# Patient Record
Sex: FEMALE | Race: WHITE | URBAN - METROPOLITAN AREA
[De-identification: names, ages, dates, MRNs, and addresses within clinical notes are randomized per-mention and may not be internally consistent; named-entity substitution may affect disease eponyms.]

---

## 2024-10-10 ENCOUNTER — OFFICE VISIT (OUTPATIENT)
Dept: FAMILY MEDICINE CLINIC | Facility: CLINIC | Age: 59
End: 2024-10-10
Payer: COMMERCIAL

## 2024-10-10 VITALS
WEIGHT: 153 LBS | SYSTOLIC BLOOD PRESSURE: 128 MMHG | OXYGEN SATURATION: 100 % | RESPIRATION RATE: 18 BRPM | TEMPERATURE: 98 F | HEIGHT: 62 IN | HEART RATE: 76 BPM | BODY MASS INDEX: 28.16 KG/M2 | DIASTOLIC BLOOD PRESSURE: 70 MMHG

## 2024-10-10 DIAGNOSIS — Z12.11 SCREENING FOR COLON CANCER: ICD-10-CM

## 2024-10-10 DIAGNOSIS — Z00.00 WELLNESS EXAMINATION: Primary | ICD-10-CM

## 2024-10-10 DIAGNOSIS — Z78.9 STATIN INTOLERANCE: ICD-10-CM

## 2024-10-10 DIAGNOSIS — I10 ESSENTIAL HYPERTENSION: ICD-10-CM

## 2024-10-10 DIAGNOSIS — Z28.21 IMMUNIZATION DECLINED: ICD-10-CM

## 2024-10-10 DIAGNOSIS — E11.65 TYPE 2 DIABETES MELLITUS WITH HYPERGLYCEMIA, WITHOUT LONG-TERM CURRENT USE OF INSULIN (HCC): ICD-10-CM

## 2024-10-10 DIAGNOSIS — Z00.00 LABORATORY EXAM ORDERED AS PART OF ROUTINE GENERAL MEDICAL EXAMINATION: ICD-10-CM

## 2024-10-10 DIAGNOSIS — L50.1 IDIOPATHIC URTICARIA: ICD-10-CM

## 2024-10-10 DIAGNOSIS — Z23 NEED FOR TDAP VACCINATION: ICD-10-CM

## 2024-10-10 DIAGNOSIS — Z12.31 ENCOUNTER FOR SCREENING MAMMOGRAM FOR MALIGNANT NEOPLASM OF BREAST: ICD-10-CM

## 2024-10-10 DIAGNOSIS — Z23 NEED FOR VACCINATION AGAINST STREPTOCOCCUS PNEUMONIAE: ICD-10-CM

## 2024-10-10 PROCEDURE — 90677 PCV20 VACCINE IM: CPT | Performed by: FAMILY MEDICINE

## 2024-10-10 PROCEDURE — 90471 IMMUNIZATION ADMIN: CPT | Performed by: FAMILY MEDICINE

## 2024-10-10 PROCEDURE — 90715 TDAP VACCINE 7 YRS/> IM: CPT | Performed by: FAMILY MEDICINE

## 2024-10-10 PROCEDURE — 99386 PREV VISIT NEW AGE 40-64: CPT | Performed by: FAMILY MEDICINE

## 2024-10-10 PROCEDURE — 99204 OFFICE O/P NEW MOD 45 MIN: CPT | Performed by: FAMILY MEDICINE

## 2024-10-10 PROCEDURE — 90472 IMMUNIZATION ADMIN EACH ADD: CPT | Performed by: FAMILY MEDICINE

## 2024-10-10 RX ORDER — SITAGLIPTIN 100 MG/1
1 TABLET, FILM COATED ORAL DAILY
COMMUNITY
End: 2024-10-10

## 2024-10-10 RX ORDER — SITAGLIPTIN 100 MG/1
100 TABLET, FILM COATED ORAL DAILY
Qty: 90 TABLET | Refills: 1 | Status: SHIPPED | OUTPATIENT
Start: 2024-10-10

## 2024-10-10 RX ORDER — LISINOPRIL 5 MG/1
TABLET ORAL
COMMUNITY
End: 2024-10-10

## 2024-10-10 RX ORDER — BLOOD-GLUCOSE METER
KIT MISCELLANEOUS 2 TIMES DAILY
COMMUNITY
Start: 2024-08-15

## 2024-10-10 RX ORDER — LISINOPRIL 5 MG/1
2.5 TABLET ORAL 2 TIMES DAILY
Qty: 90 TABLET | Refills: 1 | Status: SHIPPED | OUTPATIENT
Start: 2024-10-10

## 2024-10-10 RX ORDER — LANCETS 28 GAUGE
1 EACH MISCELLANEOUS DAILY
Qty: 100 EACH | Refills: 3 | Status: SHIPPED | OUTPATIENT
Start: 2024-10-10 | End: 2025-10-10

## 2024-10-10 NOTE — PROGRESS NOTES
The  [employer] makes medical notes like these available to patients in the interest of transparency. Please be advised this is a medical document. Medical documents are intended to carry relevant information, facts as evident, and the clinical opinion of the practitioner. The medical note is intended as peer to peer communication and may appear blunt or direct. It is written in medical language and may contain abbreviations or verbiage that are unfamiliar.     Shereen Suarez is a 59 year old female who is here for   Chief Complaint   Patient presents with    Well Adult       HPI:     This 59-year-old female who is a new patient to my practice presents to the office for her annual wellness exam and to establish care.    The patient has moved back to [place] from [place], [place].  She states she has had a very stressful last 24 months.  Both her mother and father  within that period of time.  They are just starting to get settled in their new house.    The patient was diagnosed with type 2 diabetes in 2016 and hypertension in 2019.  The patient denies any history for heart disease.  She is taking her blood pressure medication which she splits in half -taking the lisinopril 5 mg half tablet twice daily; otherwise, she gets dizzy later in the day.  This has controlled her blood pressure very well.  She is not having any chest pain, palpitations, shortness of breath, dizziness, syncope or leg swelling.    She is taking Januvia 100 mg daily and metformin 500 mg twice daily for her blood sugars.  She is concerned because her blood sugars have been running higher between 150 and 190.  She again feels this is due to all the stresses she has going on in her life.  She has not had a recent eye exam.  She needs refills on all her medications and her lancets.  The patient states her previous provider did put her on simvastatin and after 2 days, she did not tolerate it because her blood sugars  were running  high  in the 200's-as soon as she was off the simvastatin, her blood sugars corrected.  She does not want to be on a statin    Since moving back to [place] from [place], the patient has developed spontaneous hives.  These occur primarily when she is scratching her skin and she gets linear welts.  She has not been taking any antihistamines.  She has no history of swelling of the lips or tongue with the hives.  She has no past history for asthma.  She states they will last for 2 to 3 days and then resolved spontaneously.  She would like to see an allergist.    The patient had hysterectomy in 2018 due to uterine fibroids.  She states she had everything removed including her ovaries.  She is overdue for her mammogram.    She has never had any colon cancer screening.  She denies any abdominal pain, nausea, vomiting, diarrhea, constipation, melena, hematochezia or change in bowel habits.  There is no family history for colon cancer.    The patient declines her flu shot today.  She states she always gets sick after having her flu shot.  She is interested in getting her Tdap booster, Prevnar 20 and shingles vaccine.      Exercise: walking daily  Diet: watches sugar closely, low carb diet, and watches somewhat  Sleep: 6-8 hours     Depression/Anxiety:   PHQ-2 SCORE: 2  , done 10/10/2024   Little interest or pleasure in doing things: 1    Feeling down, depressed, or hopeless: 1    If you checked off any problems, how difficult have these problems made it for you to do your work, take care of things at home, or get along with other people?: Not difficult at all    Last [place] Suicide Screening on 10/10/2024 was No Risk.       Fall Risk: No falls last 3 months  Gun in home:No            Glasses/contacts:Reading glasses             Recent eye doctor visit:No   Hearing aids:No    Family History for:  Breast ca-maternal cousin  Ovarian ca-No  Uterine ca-No  Colon ca-No  Lung ca-Mom-smoker, small cell      FDLMP 2016  Last  pap smear: 2017  Last Mammogram: 2017    Colonoscopy: Never      Past Medical History:    Diabetes mellitus (HCC)    Essential hypertension    Viral meningitis (HCC)       Past Surgical History:   Procedure Laterality Date    Hysterectomy  2018    Had TAHBSO for fibroids       Family History   Problem Relation Age of Onset    Cancer Mother         small cell of lung-smoker    Stroke Father 51        lived to        Social History     Socioeconomic History    Marital status:    Tobacco Use    Smoking status: Never    Smokeless tobacco: Never   Vaping Use    Vaping status: Never Used   Substance and Sexual Activity    Alcohol use: Yes     Alcohol/week: 7.0 standard drinks of alcohol     Types: 7 Glasses of wine per week     Comment: 1 glass of wine per night    Drug use: Never    Sexual activity: Not Currently     Retired-worked in computers    Medications:    Medications Ordered Prior to Encounter[1]    Allergies:    Allergies[2]    REVIEW OF SYSTEMS:     See HPI for relevant ROS  GENERAL HEALTH: no other complaints  NEURO: no other complaints  VISION: no other complaints  RESPIRATORY: no other complaints  CARDIOVASCULAR: no other complaints  GI: no other complaints  : no other complaints  SKIN: no other complaints  PSYCH: no other complaints  EXT: no other complaints        EXAM:   /70 (BP Location: Left arm, Patient Position: Sitting, Cuff Size: adult)   Pulse 76   Temp 98 °F (36.7 °C)   Resp 18   Ht 5' 2\" (1.575 m)   Wt 153 lb (69.4 kg)   SpO2 100%   BMI 27.98 kg/m²     Wt Readings from Last 3 Encounters:   10/10/24 153 lb (69.4 kg)       BP Readings from Last 3 Encounters:   10/10/24 128/70        Visual Acuity  Right Eye Visual Acuity: Uncorrected Right Eye Chart Acuity: 20/25   Left Eye Visual Acuity: Uncorrected Left Eye Chart Acuity: 20/25   Both Eyes Visual Acuity: Uncorrected Both Eyes Chart Acuity: 20/25   Able To Tolerate Visual Acuity: Yes      General: WH/Overweight/WD, in NAD, A  and O times 3  HEAD: Normocephalic, atraumatic  EYES: USMAN, EOMI, conjunctiva normal, sclera anicteric.  EARS: Tympanic membranes normal, EAC's normal.  NOSE: Turbninates normal, no bleeding noted.  PHARYNX:  No eythema or exudates, mucous membrane moist, airway patent.  NECK:  No cervical lymphadenopathy or thyromegaly, no bruits noted.  HEART: Regular rate and rhythm, no S3, S4 or murmur noted.  LUNGS: Clear to ausculation. No retractions or tachypnea noted.  BREASTS: No masses, nipple discharge or retractions noted bilaterally. No axillary lymph nodes palpated bilaterally.  ABDOMEN: Soft, nontender, no guarding, rigidity or rebound, no masses or hepatosplenomegaly, normal bowel sounds in all four quadrants.  : deferred-s/p hysterectomy  BACK: No tenderness over the thoracic or lumbar spine. No scoliosis noted.  EXTREMITIES: No clubbing, cyanosis, edema noted. Motor strength +5/5 in all 4 extremities. DTR's +2/4 in all 4 extremities. Able to toe and heel walk.  SKIN: Warm and dry. No hives currently noted.  NEURO: Cr. N. II-XII intact, normal gait  PSYCH: Normal affect and mood.        ASSESSMENT AND PLAN:     1. Wellness examination  -We discussed the following:  Healthy diet and exercise, cancer screening, self breast exams and calcium and vitamin D supplementation. Patient encouraged to get her Shingles vaccine at her local pharmacy.    2. Laboratory exam ordered as part of routine general medical examination    - CBC With Differential With Platelet  - Comp Metabolic Panel (14)  - Lipid Panel  - TSH W Reflex To Free T4  - HCV Antibody    3. Encounter for screening mammogram for malignant neoplasm of breast    - [facility] ANDRA 2D+3D SCREENING BILAT (CPT=77067/07792); Future    4. Screening for colon cancer    Patient referred to Dr. Miner for her colonoscopy.    - Gastro Referral - In Network    5. Immunization declined    Patient declined flu shot.    - INFLUENZA REFUSED EE    6. Essential hypertension    The  patient's blood pressure is well-controlled on her current medications which I refilled today.    - lisinopril 5 MG Oral Tab; Take 0.5 tablets (2.5 mg total) by mouth 2 (two) times daily.  Dispense: 90 tablet; Refill: 1    7. Type 2 diabetes mellitus with hyperglycemia, without long-term current use of insulin (HCC)    Diabetic foot exam was discussed with the patient.  She is referred to Dr. Sarahi Grace for her diabetic eye exam.  She is due for all of her laboratory.  I refilled her medications today.    - MICROALB/CREAT RATIO, RANDOM URINE [6217] [Q]  - HGB A1C [496] [Q]  - metFORMIN 500 MG Oral Tab; Take 1 tablet (500 mg total) by mouth 2 (two) times daily.  Dispense: 180 tablet; Refill: 1  - JANUVIA 100 MG Oral Tab; Take 1 tablet (100 mg total) by mouth daily.  Dispense: 90 tablet; Refill: 1  - FreeStyle Lancets Does not apply Misc; 1 Lancet by Finger stick route daily. Use as directed.  Dispense: 100 each; Refill: 3  - OPHTHALMOLOGY - INTERNAL    8. Statin intolerance    The patient states she was unable to tolerate simvastatin because it elevated her blood sugars into the 200's.  She is due for recheck on her lipid panel.  And then we can discuss possible trial of a different statin.    9. Idiopathic urticaria    I reviewed the pictures on the patient's phone of the hives.  This looks like dermographism..  I told the patient she can take over-the-counter antihistamines such as Allegra, Claritin or Zyrtec when the events happen.  She is referred to Dr.Van Jaime, allergist, for further evaluation.    - Allergy Referral - In Network    10. Need for vaccination against Streptococcus pneumoniae    The patient was given Prevnar 20 today. Side effects were reviewed.    - Prevnar 20 (PCV20) [97958]    11. Need for Tdap vaccination    The patient was given Tdap booster today.  Side effects reviewed.  - TdaP (Adacel, Boostrix) [89184]         Health Maintenance:    Health Maintenance   Topic Date Due    Annual  Physical  10/10/2025    Colorectal Cancer Screening  Never done    Mammogram  Never done    DTaP,Tdap,and Td Vaccines (1 - Tdap) 10/10/2034    Zoster Vaccines (1 of 2) Never done    Annual Depression Screening  Completed    Influenza Vaccine (1) 10/01/2025    COVID-19 Vaccine  Completed    Pneumococcal Vaccine: Birth to 64yrs  Aged Out         Orders This Visit:  Orders Placed This Encounter   Procedures    CBC With Differential With Platelet    Comp Metabolic Panel (14)    Lipid Panel    TSH W Reflex To Free T4    HCV Antibody    MICROALB/CREAT RATIO, RANDOM URINE [6917] [Q]    HGB A1C [496] [Q]    INFLUENZA REFUSED EEH    TdaP (Adacel, Boostrix) [45908]    Prevnar 20 (PCV20) [48520]       Meds This Visit:  Requested Prescriptions     Signed Prescriptions Disp Refills    metFORMIN 500 MG Oral Tab 180 tablet 1     Sig: Take 1 tablet (500 mg total) by mouth 2 (two) times daily.    JANUVIA 100 MG Oral Tab 90 tablet 1     Sig: Take 1 tablet (100 mg total) by mouth daily.    lisinopril 5 MG Oral Tab 90 tablet 1     Sig: Take 0.5 tablets (2.5 mg total) by mouth 2 (two) times daily.    FreeStyle Lancets Does not apply Misc 100 each 3     Si Lancet by Finger stick route daily. Use as directed.       Imaging & Referrals:  INFLUENZA REFUSED EEH  TETANUS, DIPHTHERIA TOXOIDS AND ACELLULAR PERTUSIS VACCINE (TDAP), >7 YEARS, IM USE  PCV20 VACCINE FOR INTRAMUSCULAR USE  OPHTHALMOLOGY - INTERNAL  ALLERGY - INTERNAL  GASTRO - INTERNAL  [facility]A ANDRA 2D+3D SCREENING BILAT (CPT=77067/10967)       The patient indicates understanding of these issues and agrees to the plan.  The patient is asked to return in 6 months or pending lab results.  Brooke Morataya DO    Total time: 60 minutes including precharting, H&P, plan of care.    This dictation was performed with a verbal recognition program (DRAGON) and it was checked for errors. It is possible that there are still dictated errors within this office note. If so, please bring any  errors to my attention for an addendum. All efforts were made to ensure that this office note is accurate         [1]   Current Outpatient Medications on File Prior to Visit   Medication Sig Dispense Refill    Glucose Blood (FREESTYLE LITE TEST) In Vitro Strip 2 (two) times a day.       No current facility-administered medications on file prior to visit.   [2]   Allergies  Allergen Reactions    Sulfa Antibiotics HIVES

## 2024-10-10 NOTE — PATIENT INSTRUCTIONS
Go to the [facility] for your fasting blood tests. Do not eat or drink except for water for at least 8 hours prior to the blood tests. Do not take any vitamins or Biotin for 3 days before your blood tests.    Schedule your mammogram as ordered.    Schedule your colonoscopy with Dr. Miner or one of her partners as ordered.    Schedule your diabetic eye exam with Dr. Sarahi Grace.    Schedule your appointment with the allergist, Dr. Diaz, for follow up on your hives.    Continue your medications as prescribed.    You received the Flu and Tdap (tetanus, diphtheria, pertussis-whooping cough) vaccines today. You may run a low grade fever, have mild redness or swelling at the site of the shot, muscle pain at the site of the shot for the next 2-3 days. You may take Tylenol or Ibuprofen as needed. Use your arm to help decrease pain and swelling. You can apply ice to any swelling for 10-15 minutes twice daily through clothing or a towel.    Get your shingles vaccine at your local pharmacy.    Recommendations for exercise are 3-5 times weekly for 30-60 minutes for a minimum of 150-300 minutes.     For premenopausal women and men, 1000 mg of calcium daily is recommended. For postmenopausal women, 1200 mg of calcium daily is recommended.    To help the body absorb and use calcium, vitamin D 2000 international units daily is recommended.    I will contact you with your test results once available.    Controlling High Blood Pressure   High blood pressure (hypertension) is often called the silent killer. This is because many people who have it, don’t know it. It can be very dangerous. High blood pressure can raise your risk of heart attack, stroke, heart disease, and heart failure. Controlling your blood pressure can lower your risk of these problems. It's important to check yourblood pressure regularly. It can save your life.   Blood pressure measurements are given as 2 numbers. Systolic blood pressure is the upper number.  This is the pressure when the heart contracts. Diastolic blood pressure is the lower number. This is the pressure when the heartrelaxes between beats.   Blood pressure is grouped like this:   Normal blood pressure. This is systolic of less than 120 and diastolic of less than 80 (120/80).  Elevated blood pressure.  This is systolic of 120 to 129 and diastolic less than 80.  Stage 1 high blood pressure.  This is systolic of 130 to 139 or diastolic between 80 to 89.  Stage 2 high blood pressure.  This is systolic of 140 or higher or diastolic of 90 or higher.  A heart-healthy lifestyle can help you control your blood pressure withoutmedicines. Below are some things you can do to have a heart-healthy lifestyle.     Eat heart-healthy foods   Choose low-salt, low-fat foods. Limit your sodium to 2,300 mg per day or the amount advised by your healthcare provider.  Limit canned, dried, cured, packaged, and fast foods. These can contain a lot of salt.  Eat 8 to 10 servings of fruits and vegetables every day.  Choose lean meats, fish, or chicken.  Eat whole-grain pasta, brown rice, and beans.  Eat 2 to 3 servings of low-fat or fat-free dairy products.  Ask your doctor about the DASH eating plan. This plan helps reduce blood pressure.  When you go to a restaurant, ask that your meal be made with no added salt.    Stay at a healthy weight   Ask your healthcare provider how many calories to eat a day. Then stick to that number.  Ask your provider what weight range is healthiest for you. If you are overweight, a weight loss of only 3% to 5% of your body weight can help lower blood pressure. A good weight loss goal is to lose 10% of your body weight in a year.  Limit snacks and sweets.  Get regular exercise.    Get more active   Find activities you enjoy. They can be done alone or with friends or family. Try bicycling, dancing, walking, or jogging.  Park farther away from building entrances to walk more.  Use stairs instead of the  elevator.  When you can, walk or bike instead of driving.  [place] leaves, garden, or do household repairs.  Be active at a moderate to vigorous level of physical activity for at least 30 minutes a day for at least 5 days a week.     Manage stress   Make time to relax and enjoy life. Find time to laugh.  Talk about your concerns with your loved ones and your healthcare provider.  Visit with family and friends, and keep up with hobbies.    Limit alcohol and quit smoking   Men should have no more than 2 drinks per day.  Women should have no more than 1 drink per day.  If you smoke, make a plan to stop. Talk with your healthcare provider for help. Smoking greatly raises your risk for heart disease and stroke. Ask your provider about stop-smoking programs and other support.    Blood pressure medicines  If your lifestyle changes aren’t enough, your healthcare provider may prescribe high blood pressure medicine. Take all medicines as prescribed. If you have any questions about yourmedicines, ask your provider before stopping or changing them.   [employer] last reviewed this educational content on12/1/2021 © 2000-2022 [employer]. All rights reserved. This information is not intended as a substitute for professional medical care. Always follow yourhealthcare professional's instruction    Video frintit™  What is High Blood Pressure?  Understand what blood pressure is, the health risks of having high blood pressure, the factors that put you at risk for having high blood pressure, and the importance of working with your healthcare provider to control it.  To watch the video:  Scan the QR code  Using your mobile device, scan the following code:  OR  Go to the website:  [email or web address]  Enter the prescription code:  3414E    © 2000-2022 [employer]. All rights reserved. This information is not intended as a substitute for professional medical care. Always follow your healthcare  professional's instructions.    Video HealthSheets™  Eating Well with High Blood Pressure  Certain foods can make your blood pressure go too high. Watch and learn how easy it is to have delicious meals without harming your health.     To watch the video:  Scan the QR code  Using your mobile device, scan the following code:  OR  Go to the website:  www.[employer]  Enter the prescription code:   QIX       © 7593-5163 [employer]. All rights reserved. This information is not intended as a substitute for professional medical care. Always follow your healthcare professional's instructions.    Taking Your Blood Pressure  Blood pressure is the force of blood against the artery wall as it moves from the heart through the blood vessels. You can take your own blood pressure reading using a digital monitor. Take your readings the same each time, usingthe same arm. Take readings as often as your healthcare provider advises.   About blood pressure monitors  Blood pressure monitors are designed for certain ages and cases. You can find monitors for older adults, for pregnant women, and for children. Make sure theone you choose is the right one for your age and situation.   Experts advise an automatic cuff monitor that fits on your upper arm (bicep). The cuff should fit your arm size. A cuff that’s too large or too small won't give an accurate reading. Measure around yourupper arm to find your size.   Monitors that attach to your finger or wrist are not as accurate as monitorsfor your upper arm.   Ask your healthcare provider for help in choosing a monitor. Bring your monitorto your next provider visit if you need help in using it the correct way.   The steps below are general instructions for using an automatic digitalmonitor.   Step 1. Relax    Take your blood pressure at the same time every day, such as in the morning or evening. Or take it at the time your healthcare provider advises.  Wait at least 30  minutes after smoking, eating, or exercising. Don't drink coffee, tea, soda, or other caffeinated drinks before checking your blood pressure. Use the restroom beforehand.  Sit comfortably at a table with both feet on the floor. Don't cross your legs or feet. Place the monitor near you.  Rest for at least 5 minutes before you begin. Make sure there are no distractions. This includes TV, cell phones, and other electronics. Wait to have conversations with others until after you measure you blood pressure.  Step 2. Wrap the cuff    Place your arm on the table, palm up. Your arm should be at the level of your heart. Wrap the cuff around your upper arm, just above your elbow. It’s best done on bare skin, not over clothing. Most cuffs will show you where the blood vessel in the middle of the arm at the inner side of the elbow (the brachial artery) should line up with the cuff. Look in your monitor's instruction booklet for an illustration. You can also bring your cuff to your healthcare provider and have them show you how to correctly place the cuff.  Step 3. Inflate the cuff    Push the button that starts the pump.  The cuff will tighten, then loosen.  The numbers will change. When they stop changing, your blood pressure reading will appear.  Take 2 or 3 readings 1 minute apart, or as advised by your provider.  Step 4. Write down the results of each reading    Write down your blood pressure numbers for each reading. Note the date and time. Keep your results in 1 place, such as a notebook. Even if your monitor has a built-in memory, keep a hard copy of the readings.  Remove the cuff from your arm. Turn off the machine.  Bring your blood pressure records with you to each provider visit.  If you start a new blood pressure medicine, note the day you started the new medicine. Also note the day if you change the dose of your medicine. Measure your blood pressure before your take your medicine. This information goes on your  blood pressure recording sheet. This will help your provider check how well the medicine changes are working.  Ask your provider what numbers mean that you should call them. Also ask what numbers mean that you should get help right away.  [employer] last reviewed this educational content on12/1/2021 © 2000-2022 [employer]. All rights reserved. This information is not intended as a substitute for professional medical care. Always follow yourhealthcare professional's instructions.      Video Sc[employer]erm  What is Type 2 Diabetes?  Watch this clip to understand what happens within your body when you have type 2 diabetes, and the importance of keeping your blood glucose levels within a healthy range.  To watch the video:  Scan the QR code  Using your mobile device, scan the following code:  OR  Go to the website:  [employer]  Enter the prescription code:  1576E    © 2000-2022 [employer]. All rights reserved. This information is not intended as a substitute for professional medical care. Always follow your healthcare professional's instructions.    Managing Type 2 Diabetes  Type 2 diabetes is a long-term (chronic) condition. Managing diabetes may mean making some tough changes. Yourhealthcare team can help you.  To manage type 2 diabetes, you'll need to balance your medicine with diet and activity. You will also need check your blood sugar. And work with yourhealthcare provider to prevent complications.    Take your medicine  You may take pills or give yourself insulin shots for diabetes. Or you may use both. Take your medicines or give yourself insulin at the right times to help you control your blood sugar. Think about ways that will help you remember to take your medicines the right way every day. Ask your healthcare provider orteam for ideas.  You may only take pills for your diabetes. But this may change. Over time, mostpeople with type 2 diabetes also need  insulin.  Eat healthy  A healthy diet helps control the amount of sugar in your blood. It also helps you stay at a healthy weight. Or it helps you lose weight, if if you'reoverweight. Extra weight makes it harder to control diabetes.  Your healthcare team will help you create a plan that works for you. You don'thave to give up all the foods you like. Have meals and snacks with:  Vegetables  Fruits  Lean meats or other healthy proteins  Whole grains  Low- or nonfat dairy products     Be physically active  Being active helps lower your blood sugar. Activity helps your body use insulinto turn food into energy. It also helps you manage your weight:  Ask your healthcare provider to help you to make an activity program that's right for you. Your program is based on your age, general health, and types of activity you enjoy. Start slow. But aim for at least 150 minutes of exercise or activity each week. Start with 30 minutes a day. Exercise in 10-minute blocks. Don’t let more than 2 days go by without being active.  Check your blood sugar  Checking your own blood sugar may be a regular part of your care. Or you may only need to check your blood sugar from time to time. Your healthcare provider will tell you how to check your blood sugar at home. Checking it tells you if your blood sugar is in your target range. Having your blood sugars within thetarget range means that you are managing your diabetes well.  If your blood sugar levels are too high or too low, your healthcare provider may suggest changes to your diet or activity level. He or she may also adjustyour medicine.  Your healthcare provider may also tell you to check your blood sugar more oftenwhen you are sick.  Take care of yourself  When you have diabetes, you may be more likely to get other health problems. They include foot, eye, heart, nerve, and kidney problems. You can help prevent these problems by controlling your blood sugar and taking good care of  yourself. Your healthcare provider, nurse, diabetes educator, and others canhelp you with the following:  Checkups. You should have regular checkups with your healthcare provider. At those visits, you will have a physical exam that includes checking your feet. Your healthcare provider will also check your blood pressure and weight. Take your shoes off before your appointment starts to be sure your feet are checked.  Other exams. You'll also need eye, foot, and dental exams at least once each year.  Lab tests. You will have blood and urine tests:  Your healthcare provider will check your hemoglobin A1C at least twice a year. This blood test shows how well you have been controlling your blood sugar over 2 to 3 months. The results help your healthcare provider manage your diabetes.    You will also have other lab tests. For example, to check for kidney problems and abnormal cholesterol levels.  Smoking. If you smoke, you will need to quit. Smoking makes it more likely to get complications from diabetes. Ask your healthcare provider about ways to quit. Also don't use e-cigarette, or vaping, products.  Vaccines. Get a yearly flu shot. And ask your healthcare provider about vaccines to prevent pneumonia, shingles, and hepatitis B.  Stress and depression  Most people have challenges throughout their lives. Living with diabetes can increase your stress. Feeling stressed or depressed can actually affect yourblood sugar levels.  Tell your healthcare provider if you are having trouble coping with diabetes.He or she can help or refer you to other providers or programs.  To learn more  Know where you can get help. You can try the following:  Support. Ask family and friends to support your efforts to take care of yourself. Or look for a diabetes support group nearby or on the internet. Check the Connect with Others link on www.diabetes.org.  Counseling. Talk with a , psychologist, psychiatrist, or other  counselor.  Information. Contact the American Diabetes Association at 232-879-2176 or www.diabetes.org  [employer]Well last reviewed this educational content on11/1/2019    © 1358-7591 [employer]. All rights reserved. This information is not intended as a substitute for professional medical care. Always follow yourhealthcare professional's instructions.    Type 2 Diabetes and Food Choices  You make food choices every day. Whole wheat or white bread? A side of French fries or fresh fruit? Eat now or later? Choices about what, when, and how much you eat affect your blood sugar (glucose), and also your blood pressure and cholesterol. Understanding how food affects blood glucose is the first step in managing diabetes. And following a diabetesmeal plan can help you keep your blood glucose levels on track.   Prevent problems  Having type 2 diabetes means that your body doesn’t control blood glucose well. When blood glucose stays too high for too long, serious health problems can develop. It's important to control your blood glucose through diet, exercise, and medicine. This can delay or prevent kidney,eye, nerve, and heart disease, and other complications of diabetes.   Control carbohydrates  Carbohydrates are foods that have the biggest effect on your blood glucose levels. After you eat carbohydrates, your blood glucose rises. Fruit, sweet foods and drinks, starchy foods (such as bread, potatoes, and rice), and milk and milk products contain carbohydrates. Carbohydrates are important for health. But when you eat too many at once, your blood glucose can go too high. This is even more likely if you don't have or take enough insulin forthat food.   Some carbohydrates may raise blood glucose more than others. These include potatoes, sweets, and white bread. Better choices are less processed foods with more fiber and nutrients. Good options are 100% whole-wheat bread, oatmeal,brown rice, and nonstarchy vegetables.    Learn to use food labels that show added sugar. And try to find healthierchoices, particularly if you are overweight.    Food and medicine  Insulin helps glucose move from the blood into your muscle cells, where it can be used for energy. Some diabetes medicines that are taken by mouth help you make more insulin. Or they help your insulin work more efficiently. So your medicines and food plan have to work together. If you take insulin shots, you need to be very careful to match the amount of carbohydrates you eat with your insulin dose. If you have too many carbohydrates without adjusting your insulin dose, your blood glucose might become too high. If you have too few carbohydrates, your blood glucose might be too low. Your healthcare provider ora dietitian can help you match your food choices to your medicine.   Have a meal plan  With certain medicines, it's best to eat the same amount of food at the same time every day. That keeps your glucose levels stable. And it helps your medicine work best. Physical activity is an important way to control blood glucose, too. Try to exercise at the same time every day. That way you can build the extra calories you need for exercise into your meal plan. With othermedicines, you may have more choices about how much you eat and when.   If you want to change your medicine to better fit your lifestyle, talk withyour healthcare provider.   Eat smart  You can eat the same foods as everyone else, but you have to carefully watch for certain details. That’s where your diabetes meal plan comes in. A personal meal plan tells you the time of day to eat meals and snacks, the types of food to eat, and how much. Itshould include your favorite foods. And it should focus on these healthy foods:   Whole grains, such as 100% whole-wheat bread, brown rice, and oatmeal  Nonfat or low-fat dairy products, such as nonfat milk and yogurt (but be sure these products don't have sugar added to make up  for the fat removed)  Lean meats, poultry, fish, eggs, and dried beans and peas  Foods and drinks with no added sugar  Fruits and vegetables  At first, it may be helpful to use measuring cups and spoons to make sure you’re really eating the amount of food that’s in your plan. You can also use standardized portion sizes on food labels, such as 1 serving of meat being the size of a deck of cards. By checking your blood glucose 1 to 2 hours after eating, you can learn how your food choicesaffect your blood glucose.   To create a diabetes meal plan or change a plan that’s not working for you, see a dietitian or diabetes educator. Let them know if you have any new health concerns or if your medicines have changed. Having ameal plan that you can live with will keep you at your healthy best.     © 8635-6624 [employer]. All rights reserved. This information is not intended as a substitute for professional medical care. Always follow yourhealthcare professional's instructions.    Diabetes: Caring for Your Body   When you have diabetes, your body needs special care. This care helps you stay healthy and prevent problems. Exercise and healthy eating are a part of this. You can also protect yourself by taking special care of your feet, skin, teeth,and eyes.   Caring for your feet     Follow these tips to help keep your feet healthy:  Check your feet every day for redness, blisters, cracks, dry skin, or numbness. Use a mirror to check the bottoms of your feet, if needed. Or ask for help.  Wash your feet in warm (not hot) water. Don’t soak them.  Use an emery board to keep your toenails even with the ends of your toes. File away sharp edges. A foot doctor (podiatrist) may need to cut your toenails for you.  Smooth down calluses gently or wait until your next podiatry appointment.  Keep your skin soft and smooth by putting a thin layer of skin lotion on the tops and bottoms of your feet. Don't put lotion in between  your toes.  Always wear shoes or slippers, even inside your home. Make sure that shoes are correctly fitted, not too tight and not too loose. This can cause friction and rubbing of your feet. Change your socks daily. Always check shoes for foreign objects before putting them on.  Call your healthcare provider right away if your feet are numb or painful. Also call your provider if a cut or sore doesn’t heal in a few days.  Preventing skin infections   To prevent skin infections, bathe every day, but use a moderate water temperature.. Dry yourself well, especially between your toes. Try to keep your home on the humid side during the colder months of the year to prevent your skin getting dry. Wash any cuts with warm, soapy water. Cover with a sterile bandage. Call your healthcare provider if a cut or sore doesn't heal in a fewdays, feels warm, itches, is swollen, or has a bad smell.   Caring for your teeth   Follow these guidelines for healthy teeth:  Brush your teeth twice daily.  Floss your teeth daily.  See your dentist at least twice yearly.  Keep your blood sugar in a good range.  Caring for your eyes  Have your eyes checked every year by an optometrist or ophthalmologist. Letyour healthcare provider know if you experience any of the following symptoms:   Blurred vision  Dark spots or \"holes\"  Flashes of light  Seeing more floaters than usual  Poor night vision  If you smoke, quit  Smoking is dangerous for everyone, especially people with diabetes. It can harm the blood vessels in your eyes, kidneys, nerves, and heart. It raises blood pressure. Smoking can also slow healing, so infections are more likely. As[place]our healthcare provider about programs to help you stop smoking.   [employer] last reviewed this educational content on12/1/2021 © 2000-2022 [employer]. All rights reserved. This information is not intended as a substitute for professional medical care. Always [facility]  professional's instructions.

## 2024-10-21 ENCOUNTER — TELEPHONE (OUTPATIENT)
Dept: FAMILY MEDICINE CLINIC | Facility: CLINIC | Age: 59
End: 2024-10-21

## 2024-10-21 ENCOUNTER — MED REC SCAN ONLY (OUTPATIENT)
Dept: FAMILY MEDICINE CLINIC | Facility: CLINIC | Age: 59
End: 2024-10-21

## 2024-10-21 NOTE — TELEPHONE ENCOUNTER
Received records from Dr. Diaz, allergist-[facility] 10/21/2024    Diagnosis:    Dermographism:    Reassurance provided that this condition often resolves on its own within a few weeks.  Recommend continuing symptomatic treatment with moisturizers and oral antihistamines as needed.  Follow-up of rashes recur or worsen.

## 2024-10-23 PROBLEM — D12.4 BENIGN NEOPLASM OF DESCENDING COLON: Status: ACTIVE | Noted: 2024-10-23

## 2024-10-23 PROBLEM — D12.8 BENIGN NEOPLASM OF RECTUM: Status: ACTIVE | Noted: 2024-10-23

## 2024-10-23 PROBLEM — Z12.11 SPECIAL SCREENING FOR MALIGNANT NEOPLASM OF COLON: Status: ACTIVE | Noted: 2024-10-23

## 2024-10-31 ENCOUNTER — MED REC SCAN ONLY (OUTPATIENT)
Dept: FAMILY MEDICINE CLINIC | Facility: CLINIC | Age: 59
End: 2024-10-31

## 2024-10-31 ENCOUNTER — PATIENT MESSAGE (OUTPATIENT)
Dept: FAMILY MEDICINE CLINIC | Facility: CLINIC | Age: 59
End: 2024-10-31

## 2024-10-31 ENCOUNTER — TELEPHONE (OUTPATIENT)
Dept: FAMILY MEDICINE CLINIC | Facility: CLINIC | Age: 59
End: 2024-10-31

## 2024-10-31 DIAGNOSIS — E11.65 TYPE 2 DIABETES MELLITUS WITH HYPERGLYCEMIA, WITHOUT LONG-TERM CURRENT USE OF INSULIN (HCC): ICD-10-CM

## 2024-10-31 LAB
ABSOLUTE BASOPHILS: 50 CELLS/UL (ref 0–200)
ABSOLUTE EOSINOPHILS: 331 CELLS/UL (ref 15–500)
ABSOLUTE LYMPHOCYTES: 1382 CELLS/UL (ref 850–3900)
ABSOLUTE MONOCYTES: 583 CELLS/UL (ref 200–950)
ABSOLUTE NEUTROPHILS: 4853 CELLS/UL (ref 1500–7800)
ALBUMIN/GLOBULIN RATIO: 1.5 (CALC) (ref 1–2.5)
ALBUMIN: 4.3 G/DL (ref 3.6–5.1)
ALKALINE PHOSPHATASE: 82 U/L (ref 37–153)
ALT: 16 U/L (ref 6–29)
AST: 17 U/L (ref 10–35)
BASOPHILS: 0.7 %
BILIRUBIN, TOTAL: 0.3 MG/DL (ref 0.2–1.2)
BUN: 11 MG/DL (ref 7–25)
CALCIUM: 9.4 MG/DL (ref 8.6–10.4)
CARBON DIOXIDE: 24 MMOL/L (ref 20–32)
CHLORIDE: 104 MMOL/L (ref 98–110)
CHOL/HDLC RATIO: 2.9 (CALC)
CHOLESTEROL, TOTAL: 180 MG/DL
CREATININE, RANDOM URINE: 116 MG/DL (ref 20–275)
CREATININE: 0.73 MG/DL (ref 0.5–1.05)
EGFR: 94 ML/MIN/1.73M2
EOSINOPHILS: 4.6 %
GLOBULIN: 2.8 G/DL (CALC) (ref 1.9–3.7)
GLUCOSE: 160 MG/DL (ref 65–99)
HDL CHOLESTEROL: 62 MG/DL
HEMATOCRIT: 38.5 % (ref 35–45)
HEMOGLOBIN A1C: 9.1 % OF TOTAL HGB
HEMOGLOBIN: 12.3 G/DL (ref 11.7–15.5)
LDL-CHOLESTEROL: 99 MG/DL (CALC)
LYMPHOCYTES: 19.2 %
MCH: 29.1 PG (ref 27–33)
MCHC: 31.9 G/DL (ref 32–36)
MCV: 91.2 FL (ref 80–100)
MICROALBUMIN/CREATININE RATIO, RANDOM URINE: 9 MG/G CREAT
MICROALBUMIN: 1.1 MG/DL
MONOCYTES: 8.1 %
MPV: 10.8 FL (ref 7.5–12.5)
NEUTROPHILS: 67.4 %
NON-HDL CHOLESTEROL: 118 MG/DL (CALC)
PLATELET COUNT: 253 THOUSAND/UL (ref 140–400)
POTASSIUM: 4.1 MMOL/L (ref 3.5–5.3)
PROTEIN, TOTAL: 7.1 G/DL (ref 6.1–8.1)
RDW: 12.6 % (ref 11–15)
RED BLOOD CELL COUNT: 4.22 MILLION/UL (ref 3.8–5.1)
SODIUM: 140 MMOL/L (ref 135–146)
TRIGLYCERIDES: 94 MG/DL
TSH W/REFLEX TO FT4: 2.51 MIU/L (ref 0.4–4.5)
WHITE BLOOD CELL COUNT: 7.2 THOUSAND/UL (ref 3.8–10.8)

## 2024-10-31 NOTE — TELEPHONE ENCOUNTER
Eye exam-Dr. Sarahi Grace-DOS 10/29/2024    Assessment:    1.  Type 2 diabetes mellitus without complications-no retinopathy bilateral  2.  Age-related nuclear cataract, right eye  3.  Age-related nuclear cataract left eye.    Patient to follow-up in 1 year.   Mom leaves voice mail with concern for several days, now has ear pain.    Return call to mom who states she has had congestion for 3-4 days, runny nose,   No fever, no cough.    Just in the last couple of ears she is complaining of right ear, hurts when she pulls on it or pushes on it.    Mom will call back for same day appt in am, if continues.    Reason for Disposition  • Earache also present    Protocols used: COLDS-P-AH

## 2024-11-01 ENCOUNTER — HOSPITAL ENCOUNTER (OUTPATIENT)
Dept: MAMMOGRAPHY | Age: 59
Discharge: HOME | End: 2024-11-01
Attending: FAMILY MEDICINE
Payer: COMMERCIAL

## 2024-11-01 DIAGNOSIS — Z12.31 ENCOUNTER FOR SCREENING MAMMOGRAM FOR MALIGNANT NEOPLASM OF BREAST: ICD-10-CM

## 2024-11-01 PROCEDURE — 77063 BREAST TOMOSYNTHESIS BI: CPT | Performed by: FAMILY MEDICINE

## 2024-11-01 PROCEDURE — 77067 SCR MAMMO BI INCL CAD: CPT | Performed by: FAMILY MEDICINE

## 2024-11-11 ENCOUNTER — HOSPITAL ENCOUNTER (OUTPATIENT)
Dept: MAMMOGRAPHY | Age: 59
Discharge: HOME | End: 2024-11-11
Attending: FAMILY MEDICINE
Payer: COMMERCIAL

## 2024-11-11 ENCOUNTER — HOSPITAL ENCOUNTER (OUTPATIENT)
Dept: ULTRASOUND IMAGING | Age: 59
Discharge: HOME | End: 2024-11-11
Attending: FAMILY MEDICINE
Payer: COMMERCIAL

## 2024-11-11 DIAGNOSIS — R92.2 INCONCLUSIVE MAMMOGRAM: ICD-10-CM

## 2024-11-11 PROCEDURE — 77065 DX MAMMO INCL CAD UNI: CPT | Performed by: FAMILY MEDICINE

## 2024-11-11 PROCEDURE — 77061 BREAST TOMOSYNTHESIS UNI: CPT | Performed by: FAMILY MEDICINE

## 2024-11-11 PROCEDURE — 76642 ULTRASOUND BREAST LIMITED: CPT | Performed by: FAMILY MEDICINE

## 2024-11-19 ENCOUNTER — PATIENT MESSAGE (OUTPATIENT)
Dept: FAMILY MEDICINE CLINIC | Facility: CLINIC | Age: 59
End: 2024-11-19

## 2024-11-19 DIAGNOSIS — E11.65 TYPE 2 DIABETES MELLITUS WITH HYPERGLYCEMIA, WITHOUT LONG-TERM CURRENT USE OF INSULIN (HCC): ICD-10-CM

## 2024-11-19 NOTE — TELEPHONE ENCOUNTER
Patient is asking for new script for metformin since she is taking 4 tablets per day.  Would like sent to [facility]    Januvia will no longer be a covered med for her in 2025 and is asking for a substitute.  She has enough of this med until January. She said they listed Zituvio as an alternative

## 2024-11-20 RX ORDER — SITAGLIPTIN 100 MG/1
100 TABLET ORAL DAILY
Qty: 90 TABLET | Refills: 1 | Status: SHIPPED | OUTPATIENT
Start: 2024-11-20

## 2024-11-20 NOTE — TELEPHONE ENCOUNTER
Let the patient know I sent the prescription for Jituvio as the replacement for her Januvia and refilled her Metformin. The patient should schedule an appointment for April for follow up her diabetes.

## 2025-01-20 ENCOUNTER — TELEPHONE (OUTPATIENT)
Dept: FAMILY MEDICINE CLINIC | Facility: CLINIC | Age: 60
End: 2025-01-20

## 2025-01-20 DIAGNOSIS — E11.65 TYPE 2 DIABETES MELLITUS WITH HYPERGLYCEMIA, WITHOUT LONG-TERM CURRENT USE OF INSULIN (HCC): Primary | ICD-10-CM

## 2025-01-20 RX ORDER — SITAGLIPTIN 100 MG/1
100 TABLET ORAL DAILY
Qty: 90 TABLET | Refills: 0 | Status: SHIPPED | OUTPATIENT
Start: 2025-01-20

## 2025-01-20 NOTE — TELEPHONE ENCOUNTER
Per pharmacy/ Januvia 100 mg no longer covered. Can substitute with Zituvio 100 mg or saxagliptin 5 mg.    Please review and advise

## 2025-01-20 NOTE — TELEPHONE ENCOUNTER
Faxed received from [facility] for alternative request, insurance no longer covered by insurance   Januvia 100 MG

## 2025-04-10 DIAGNOSIS — E11.65 TYPE 2 DIABETES MELLITUS WITH HYPERGLYCEMIA, WITHOUT LONG-TERM CURRENT USE OF INSULIN (HCC): ICD-10-CM

## 2025-04-10 NOTE — TELEPHONE ENCOUNTER
Requested Prescriptions     Pending Prescriptions Disp Refills    SITagliptin (ZITUVIO) 100 MG Oral Tab 90 tablet 0     Sig: Take 100 mg by mouth daily.       Last Refill: 1/20/25    Last OV: 10/10/24    Next OV: 4/21/25

## 2025-04-11 RX ORDER — SITAGLIPTIN 100 MG/1
100 TABLET ORAL DAILY
Qty: 90 TABLET | Refills: 0 | Status: SHIPPED | OUTPATIENT
Start: 2025-04-11

## 2025-04-25 ENCOUNTER — OFFICE VISIT (OUTPATIENT)
Dept: FAMILY MEDICINE CLINIC | Facility: CLINIC | Age: 60
End: 2025-04-25
Payer: COMMERCIAL

## 2025-04-25 VITALS
HEIGHT: 62 IN | WEIGHT: 151 LBS | BODY MASS INDEX: 27.79 KG/M2 | TEMPERATURE: 98 F | DIASTOLIC BLOOD PRESSURE: 70 MMHG | HEART RATE: 70 BPM | OXYGEN SATURATION: 98 % | RESPIRATION RATE: 16 BRPM | SYSTOLIC BLOOD PRESSURE: 104 MMHG

## 2025-04-25 DIAGNOSIS — Z78.9 STATIN INTOLERANCE: ICD-10-CM

## 2025-04-25 DIAGNOSIS — I10 ESSENTIAL HYPERTENSION: ICD-10-CM

## 2025-04-25 DIAGNOSIS — E11.9 TYPE 2 DIABETES MELLITUS WITHOUT COMPLICATION, WITHOUT LONG-TERM CURRENT USE OF INSULIN (HCC): Primary | ICD-10-CM

## 2025-04-25 DIAGNOSIS — L50.3 DERMOGRAPHISM: ICD-10-CM

## 2025-04-25 LAB
CREAT UR-SCNC: 12.3 MG/DL
HEMOGLOBIN A1C: 7 % (ref 4.3–5.6)
MICROALBUMIN UR-MCNC: 1.3 MG/DL
MICROALBUMIN/CREAT 24H UR-RTO: 105.7 UG/MG (ref ?–30)

## 2025-04-25 PROCEDURE — 82043 UR ALBUMIN QUANTITATIVE: CPT | Performed by: FAMILY MEDICINE

## 2025-04-25 PROCEDURE — 99214 OFFICE O/P EST MOD 30 MIN: CPT | Performed by: FAMILY MEDICINE

## 2025-04-25 PROCEDURE — 82570 ASSAY OF URINE CREATININE: CPT | Performed by: FAMILY MEDICINE

## 2025-04-25 PROCEDURE — 83036 HEMOGLOBIN GLYCOSYLATED A1C: CPT | Performed by: FAMILY MEDICINE

## 2025-04-25 RX ORDER — LANCETS 28 GAUGE
1 EACH MISCELLANEOUS DAILY
Qty: 100 EACH | Refills: 3 | Status: SHIPPED | OUTPATIENT
Start: 2025-04-25 | End: 2026-04-25

## 2025-04-25 RX ORDER — LISINOPRIL 5 MG/1
2.5 TABLET ORAL 2 TIMES DAILY
Qty: 90 TABLET | Refills: 1 | Status: SHIPPED | OUTPATIENT
Start: 2025-04-25

## 2025-04-25 RX ORDER — BLOOD-GLUCOSE METER
KIT MISCELLANEOUS
Qty: 200 STRIP | Refills: 2 | Status: SHIPPED | OUTPATIENT
Start: 2025-04-25

## 2025-04-25 RX ORDER — SITAGLIPTIN 100 MG/1
100 TABLET ORAL DAILY
Qty: 90 TABLET | Refills: 1 | Status: SHIPPED | OUTPATIENT
Start: 2025-04-25

## 2025-04-25 NOTE — PATIENT INSTRUCTIONS
Start the Bempedoic Acid 180 mg one tablet nightly for your cholesterol.    Continue the rest of your medications as prescribed.    See me in 6 months for your annual physical.    Consider getting your Shingles vaccine at your local pharmacy.

## 2025-04-25 NOTE — PROGRESS NOTES
The 21st Century Cures Act makes medical notes like these available to patients in the interest of transparency. Please be advised this is a medical document. Medical documents are intended to carry relevant information, facts as evident, and the clinical opinion of the practitioner. The medical note is intended as peer to peer communication and may appear blunt or direct. It is written in medical language and may contain abbreviations or verbiage that are unfamiliar.       Shereen Suarez is a 60 year old female.    HPI:     Chief Complaint   Patient presents with    Follow - Up    Diabetes     This 60-year-old female presents to the office for follow-up on her type 2 diabetes, hypertension and dermographism.  The patient states her blood sugars at home have improved since we have increased the dose on her metformin.  This morning, her blood sugar was 103.  She has been seen by Dr. Grace for her diabetic eye exam and no retinopathy was seen.  She is currently denying any chest pain, palpitations, shortness of breath, dizziness or syncope.  She is taking her lisinopril 5 mg half a tablet twice daily, sitagliptin 100 mg daily, metformin 500 mg 2 tablets twice daily without any difficulty.  She needs refills on her medications, her test strips and her lancets today.    The patient had been seen by Dr. Diaz of allergy and was diagnosed with dermographism.  She has been taking Zyrtec which has helped with the itching.  But she states she still has days where her skin is so intensely itchy, she scratches vigorously.  She is wondering if she needs allergy testing.  She has no history for asthma.    The patient is not currently on any medication for her cholesterol because she did not tolerate the statins.  She is willing to consider trying bempedoic acid.    HISTORY:  Past Medical History[1]   Past Surgical History[2]   Family History[3]   Social History: Short Social Hx on File[4]     Medications (Active prior  to today's visit):  Current Medications[5]    Allergies:  Allergies[6]    ROS:     Pertinent positives and pertinent negatives are as listed in HPI.    All other review of symptoms were reviewed and negative.    PHYSICAL EXAM:   /70 (BP Location: Left arm, Patient Position: Sitting, Cuff Size: adult)   Pulse 70   Temp 97.9 °F (36.6 °C)   Resp 16   Ht 5' 2\" (1.575 m)   Wt 151 lb (68.5 kg)   SpO2 98%   BMI 27.62 kg/m²     Wt Readings from Last 3 Encounters:   04/25/25 151 lb (68.5 kg)   10/16/24 153 lb (69.4 kg)   10/10/24 153 lb (69.4 kg)       BP Readings from Last 3 Encounters:   04/25/25 104/70   10/10/24 128/70     Weight is down 2 pounds from her 10/10/2024 office visit.    General: Overweight, well hydrated. No acute distress. No pallor.   HEENT: Normocephalic, atraumatic.  USMAN, EOMI, Sclera clear and non icteric bilaterally. TM's normal, nose without congestion, pharynx without redness or exudates. Moist mucous membranes.  Neck: Supple. No lymphadenopathy. No thyromegaly. No bruits noted.  Heart: RRR without S3 or S4 or murmur.  Lungs: Clear to auscultation bilaterally. No rales, rhonchi or wheezes. No tachypnea or retractions noted.  Abdomen: Soft, nontender, nondistended, normal bowel sounds. No organomegaly. No guarding, rigidity or rebound noted.  Extremities: No edema bilaterally.  Bilateral barefoot skin diabetic exam is normal, visualized feet and the appearance is normal.  Bilateral monofilament/sensation of both feet is normal.  Pulsation pedal pulse exam of both lower legs/feet is normal as well.  Skin: Warm and dry.  Neuro: Alert and oriented x 3.normal gait.  Psych: Normal mood and affect.     ASSESSMENT/PLAN:   60 year old female with    1. Type 2 diabetes mellitus without complication, without long-term current use of insulin (HCC)  2. Statin intolerance    Lab Results   Component Value Date    A1C 7.0 (A) 04/25/2025    A1C 9.1 (H) 10/30/2024      I discussed the results of the  patient's hemoglobin A1c which has improved significantly.  The patient states she feels like her life is becoming more settled.  Diabetic foot exam was performed.  Diabetic footcare was reviewed.  Urine for microalbumin was obtained.  I discussed the usage of bempedoic acid 180 mg nightly for cardioprotective purposes even though her most recent lipid panel was normal.  Patient agrees to give it a try.  I refilled her medications, test strips and lancets.  She will follow-up with me in 6 months for her annual wellness exam. I reminded the patient to get her Shingles vaccine at her local pharmacy. Side effects reviewed.    - Microalb/Creat Ratio, Random Urine [E]  - POC Hemoglobin A1C  - Bempedoic Acid 180 MG Oral Tab; Take 180 mg by mouth at bedtime.  Dispense: 90 tablet; Refill: 1  - SITagliptin (ZITUVIO) 100 MG Oral Tab; Take 100 mg by mouth daily.  Dispense: 90 tablet; Refill: 1  - metFORMIN 500 MG Oral Tab; Take 2 tablets (1,000 mg total) by mouth 2 (two) times daily.  Dispense: 360 tablet; Refill: 1  - FreeStyle Lancets Does not apply Misc; 1 Lancet by Finger stick route daily. Use as directed.  Dispense: 100 each; Refill: 3  - Glucose Blood (FREESTYLE LITE TEST) In Vitro Strip; Patient tests twice daily.  Dispense: 200 strip; Refill: 2    3. Essential hypertension    Blood pressure is well-controlled on current medication which I refilled today.    - lisinopril 5 MG Oral Tab; Take 0.5 tablets (2.5 mg total) by mouth 2 (two) times daily.  Dispense: 90 tablet; Refill: 1    4. Dermographism    The skin care is discussed with the patient.  She is applying Lubriderm after bath.  She has been taking Zyrtec daily which has helped with the dermographism but she still having episodes which are making her skin intensely itchy.  I did recommend she follow-up with Dr. Diaz to see if he suggests allergy testing.      Health Maintenance:    Health Maintenance   Topic Date Due    Zoster Vaccines (1 of 2) Never done     COVID-19 Vaccine ( season) 2024    Annual Depression Screening  Completed    Diabetes Care: Foot Exam (Annual)  Completed    Diabetes Care: Microalb/Creat Ratio (Annual)  Completed    Diabetes Care A1C  10/25/2025    Influenza Vaccine (Season Ended) 10/01/2025    Annual Physical  10/10/2025    Diabetes Care Dilated Eye Exam  10/29/2025    Diabetes Care: GFR  10/30/2025    Mammogram  2025    Colorectal Cancer Screening  10/23/2029    DTaP,Tdap,and Td Vaccines (2 - Td or Tdap) 10/10/2034    Pneumococcal Vaccine: 50+ Years  Completed    Meningococcal B Vaccine  Aged Out         Meds This Visit:  Requested Prescriptions     Signed Prescriptions Disp Refills    Bempedoic Acid 180 MG Oral Tab 90 tablet 1     Sig: Take 180 mg by mouth at bedtime.    lisinopril 5 MG Oral Tab 90 tablet 1     Sig: Take 0.5 tablets (2.5 mg total) by mouth 2 (two) times daily.    SITagliptin (ZITUVIO) 100 MG Oral Tab 90 tablet 1     Sig: Take 100 mg by mouth daily.    metFORMIN 500 MG Oral Tab 360 tablet 1     Sig: Take 2 tablets (1,000 mg total) by mouth 2 (two) times daily.    FreeStyle Lancets Does not apply Misc 100 each 3     Si Lancet by Finger stick route daily. Use as directed.    Glucose Blood (FREESTYLE LITE TEST) In Vitro Strip 200 strip 2     Sig: Patient tests twice daily.       Imaging & Referrals:  None     Patient understands plan and follow-up.  Follow up in 6 months for annual physical.    2025  Brooke Morataya DO    Total time: 30 minutes including precharting, H&P, plan of care    This dictation was performed with a verbal recognition program (DRAGON) and it was checked for errors. It is possible that there are still dictated errors within this office note. If so, please bring any errors to my attention for an addendum. All efforts were made to ensure that this office note is accurate         [1]   Past Medical History:   Diabetes (HCC)    Diabetes mellitus (HCC)    Essential hypertension     Rash    appt to see allergist soon    Viral meningitis (HCC)   [2]   Past Surgical History:  Procedure Laterality Date    Colonoscopy      Hysterectomy  2018    Had TAHBSO for fibroids    Total abdom hysterectomy  January 2018    all clear   [3]   Family History  Problem Relation Age of Onset    Cancer Mother         small cell of lung-smoker    Diabetes Mother     Stroke Father 51        lived to 91    Hypertension Father     Diabetes Maternal Grandmother     Diabetes Paternal Grandfather     Heart Attack Paternal Grandfather     Diabetes Sister     Diabetes Paternal Aunt     Hypertension Sister     Colon Polyps Self         October 2024   [4]   Social History  Socioeconomic History    Marital status:    Tobacco Use    Smoking status: Never    Smokeless tobacco: Never   Vaping Use    Vaping status: Never Used   Substance and Sexual Activity    Alcohol use: Yes     Alcohol/week: 7.0 standard drinks of alcohol     Types: 7 Glasses of wine per week     Comment: 1 glass of wine per night    Drug use: Never    Sexual activity: Not Currently   [5]   Current Outpatient Medications   Medication Sig Dispense Refill    Bempedoic Acid 180 MG Oral Tab Take 180 mg by mouth at bedtime. 90 tablet 1    lisinopril 5 MG Oral Tab Take 0.5 tablets (2.5 mg total) by mouth 2 (two) times daily. 90 tablet 1    SITagliptin (ZITUVIO) 100 MG Oral Tab Take 100 mg by mouth daily. 90 tablet 1    metFORMIN 500 MG Oral Tab Take 2 tablets (1,000 mg total) by mouth 2 (two) times daily. 360 tablet 1    FreeStyle Lancets Does not apply Misc 1 Lancet by Finger stick route daily. Use as directed. 100 each 3    Glucose Blood (FREESTYLE LITE TEST) In Vitro Strip Patient tests twice daily. 200 strip 2   [6]   Allergies  Allergen Reactions    Sulfa Antibiotics HIVES     Occurred in late 1980s

## 2025-04-29 ENCOUNTER — TELEPHONE (OUTPATIENT)
Dept: FAMILY MEDICINE CLINIC | Facility: CLINIC | Age: 60
End: 2025-04-29

## 2025-04-29 ENCOUNTER — MED REC SCAN ONLY (OUTPATIENT)
Dept: FAMILY MEDICINE CLINIC | Facility: CLINIC | Age: 60
End: 2025-04-29

## 2025-04-29 NOTE — TELEPHONE ENCOUNTER
Eye exam-Dr. Sarahi Grace-DOS 04/29/2025    Assessment:    1.  Type 2 diabetes without complications-no retinopathy  2.  Age-related nuclear cataract, right eye  3.  Age-related nuclear cataract left eye.